# Patient Record
Sex: MALE | Race: WHITE | NOT HISPANIC OR LATINO | Employment: FULL TIME | URBAN - METROPOLITAN AREA
[De-identification: names, ages, dates, MRNs, and addresses within clinical notes are randomized per-mention and may not be internally consistent; named-entity substitution may affect disease eponyms.]

---

## 2019-08-02 ENCOUNTER — HOSPITAL ENCOUNTER (EMERGENCY)
Facility: HOSPITAL | Age: 24
Discharge: HOME | End: 2019-08-02
Attending: EMERGENCY MEDICINE | Admitting: EMERGENCY MEDICINE

## 2019-08-02 VITALS
TEMPERATURE: 98.8 F | SYSTOLIC BLOOD PRESSURE: 148 MMHG | HEIGHT: 77 IN | RESPIRATION RATE: 16 BRPM | HEART RATE: 89 BPM | WEIGHT: 200 LBS | DIASTOLIC BLOOD PRESSURE: 86 MMHG | BODY MASS INDEX: 23.62 KG/M2 | OXYGEN SATURATION: 99 %

## 2019-08-02 DIAGNOSIS — I10 HYPERTENSION, UNSPECIFIED TYPE: ICD-10-CM

## 2019-08-02 DIAGNOSIS — R20.2 PARESTHESIA: ICD-10-CM

## 2019-08-02 DIAGNOSIS — J02.9 SORE THROAT: Primary | ICD-10-CM

## 2019-08-02 PROCEDURE — 99283 EMERGENCY DEPT VISIT LOW MDM: CPT

## 2019-08-02 NOTE — ED TRIAGE NOTES
Throat pain x a couple months.  Has been on  2 rounds of abx.  Has seen ent.  Now is having shooting pains in arms and legs.  Had fever 103 yesterday - took motrin 0800 today

## 2019-08-02 NOTE — DISCHARGE INSTRUCTIONS
You are advised to follow closely with Dr Lujan in 1 week as needed for recheck, blood pressure recheck and further testing/treatment as needed.    Hydrate well  Healthy diet    Please return to the emergency department immediately with chest pain different than usual for you, shortness of air, abdominal pain, persistent vomiting/fever, blood in emesis or stool, lightheadedness/fainting, problems with speech, one sided weakness/numbness, new incontinence, problems with vision, difficulty swallowing, opening mouth or for worsening of symptoms or other concerns.

## 2019-08-02 NOTE — ED PROVIDER NOTES
" EMERGENCY DEPARTMENT ENCOUNTER    CHIEF COMPLAINT  Chief Complaint: Sore Throat  History given by: pt  History limited by: none  Room Number: 01/01  PMD: System, Provider Not In      HPI:  Pt is a 24 y.o. male who presents complaining of left tonsil pain and swelling that started a three months ago. Pt admits to fever yesterday that is resolved and transient \"shooting\" pains to his extremities lasting seconds that started a week ago, but denies difficulty swallowing/breathing/opening mouth. Pt states he has been on two rounds of antbiotics with no relief. Pt states he saw ENT as well with no change. Pt states he stopped smoking about three months ago when the pain started.    Duration:  Three months  Onset: gradual  Timing: constant  Location: left tonsil  Radiation: none  Quality: \"pain\"  Intensity/Severity: moderate  Progression: unchanged  Associated Symptoms:  fever yesterday that is resolved and \"shooting\" pains to the extremities that started a week ago  Aggravating Factors: none  Alleviating Factors: none  Previous Episodes: none  Treatment before arrival: two rounds of antibiotics with no relief.    PAST MEDICAL HISTORY  Active Ambulatory Problems     Diagnosis Date Noted   • No Active Ambulatory Problems     Resolved Ambulatory Problems     Diagnosis Date Noted   • No Resolved Ambulatory Problems     No Additional Past Medical History       PAST SURGICAL HISTORY  History reviewed. No pertinent surgical history.    FAMILY HISTORY  History reviewed. No pertinent family history.    SOCIAL HISTORY  Social History     Socioeconomic History   • Marital status:      Spouse name: Not on file   • Number of children: Not on file   • Years of education: Not on file   • Highest education level: Not on file   Tobacco Use   • Smoking status: Former Smoker   • Smokeless tobacco: Never Used   Substance and Sexual Activity   • Alcohol use: No     Frequency: Never   • Drug use: No   • Sexual activity: No " "      ALLERGIES  Patient has no known allergies.    REVIEW OF SYSTEMS  Review of Systems   Constitutional: Positive for fever (yesterday (resolved)). Negative for chills.   HENT: Positive for sore throat (left tonsilar pain). Negative for trouble swallowing.    Eyes: Negative for visual disturbance.   Respiratory: Negative for cough and shortness of breath.    Cardiovascular: Negative for chest pain and leg swelling.   Gastrointestinal: Negative for abdominal pain, diarrhea and vomiting.   Endocrine: Negative.    Genitourinary: Negative for decreased urine volume and frequency.   Musculoskeletal: Positive for myalgias (\"shooting\" pains to the extremities). Negative for neck pain.   Skin: Negative for rash.   Allergic/Immunologic: Negative.    Neurological: Negative for weakness and numbness.   Hematological: Negative.    Psychiatric/Behavioral: Negative.    All other systems reviewed and are negative.      PHYSICAL EXAM  ED Triage Vitals   Temp Heart Rate Resp BP SpO2   08/02/19 0952 08/02/19 0952 08/02/19 0952 08/02/19 0956 08/02/19 0952   98.8 °F (37.1 °C) 89 16 148/86 99 %      Temp src Heart Rate Source Patient Position BP Location FiO2 (%)   08/02/19 0952 08/02/19 0952 08/02/19 0956 -- --   Tympanic Monitor Sitting         Physical Exam   Constitutional: He is oriented to person, place, and time.  Non-toxic appearance. He appears distressed (mildly).   HENT:   Head: Normocephalic and atraumatic.   Right Ear: Tympanic membrane normal.   Left Ear: Tympanic membrane normal.   Mouth/Throat: Oropharynx is clear and moist and mucous membranes are normal. No oropharyngeal exudate, posterior oropharyngeal edema, posterior oropharyngeal erythema or tonsillar abscesses.   Left tonsil is slightly more prominent than right without exudate, no uvular deviation, oropharynx widely open, voice normal.  No significant cervical nor occipital adenopathy.   Eyes: EOM are normal.   Neck: Normal range of motion.   Cardiovascular: " Normal rate, regular rhythm and normal heart sounds.   No murmur heard.  Pulses:       Posterior tibial pulses are 2+ on the right side, and 2+ on the left side.   Pulmonary/Chest: Effort normal and breath sounds normal. No respiratory distress. He has no wheezes.   Abdominal: Soft. Bowel sounds are normal. There is no tenderness. There is no rebound and no guarding.   Musculoskeletal: Normal range of motion. He exhibits no edema.   Extremities are soft, not tense   Lymphadenopathy:     He has no cervical adenopathy.   Neurological: He is alert and oriented to person, place, and time. He has normal strength and normal reflexes.   Strength is 5/5 BUEs.   Skin: Skin is warm and dry.   Psychiatric: Affect normal.   Nursing note and vitals reviewed.      PROCEDURES  Procedures        PROGRESS AND CONSULTS     1020  Discussed physical exam findings. Discussed option to do basic labs. Pt denies. Discussed plan to discharge the pt and have the pt follow up with ENT. Dicussed that pt can try salt water gargles. Pt understands and agrees with the plan. All questions have been answered.    1031  Pt states that he was concerened because he has yet to have any throat swabs due to the sore throat. I discussed with the pt why I do not think a throat swab is necessary at this time. Pt expresses his understanding. All questions have been answered.        MEDICAL DECISION MAKING  Results were reviewed/discussed with the patient and they were also made aware of online access. Pt also made aware that some labs, such as cultures, will not be resulted during ER visit and follow up with PMD is necessary.     MDM       DIAGNOSIS  Final diagnoses:   Sore throat   Paresthesia   Hypertension, unspecified type       DISPOSITION  DISCHARGE    Patient discharged in stable condition.    Reviewed implications of results, diagnosis, meds, responsibility to follow up, warning signs and symptoms of possible worsening, potential complications and  reasons to return to ER.    Patient/Family voiced understanding of above instructions.    Discussed plan for discharge, as there is no emergent indication for admission. Patient referred to primary care provider for BP management due to today's BP. Pt/family is agreeable and understands need for follow up and repeat testing.  Pt is aware that discharge does not mean that nothing is wrong but it indicates no emergency is present that requires admission and they must continue care with follow-up as given below or physician of their choice.     FOLLOW-UP  your primary care provider    Schedule an appointment as soon as possible for a visit in 1 week  As needed    Van Carlin MD  [address] [facility] [address]  [place] [address]  [phone]    Schedule an appointment as soon as possible for a visit in 2 weeks  As needed         Medication List      No changes were made to your prescriptions during this visit.           Latest Documented Vital Signs:  As of 10:32 AM  BP- 148/86 HR- 89 Temp- 98.8 °F (37.1 °C) (Tympanic) O2 sat- 99%    --  Documentation assistance provided by gary Medina for Dr Flower.  Information recorded by the scribe was done at my direction and has been verified and validated by me.     Diana Medina  08/02/19 1032       Luiza Flower MD  08/03/19 [id number]